# Patient Record
Sex: MALE | Race: WHITE | NOT HISPANIC OR LATINO | Employment: FULL TIME | URBAN - METROPOLITAN AREA
[De-identification: names, ages, dates, MRNs, and addresses within clinical notes are randomized per-mention and may not be internally consistent; named-entity substitution may affect disease eponyms.]

---
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
## 2022-03-25 ENCOUNTER — TELEPHONE (OUTPATIENT)
Dept: PSYCHIATRY | Facility: CLINIC | Age: 22
End: 2022-03-25

## 2022-04-22 ENCOUNTER — APPOINTMENT (EMERGENCY)
Dept: RADIOLOGY | Facility: HOSPITAL | Age: 22
End: 2022-04-22
Payer: MEDICARE

## 2022-04-22 ENCOUNTER — HOSPITAL ENCOUNTER (EMERGENCY)
Facility: HOSPITAL | Age: 22
Discharge: HOME | End: 2022-04-22
Attending: EMERGENCY MEDICINE
Payer: MEDICARE

## 2022-04-22 VITALS
RESPIRATION RATE: 18 BRPM | BODY MASS INDEX: 34.57 KG/M2 | TEMPERATURE: 99.7 F | HEIGHT: 74 IN | WEIGHT: 269.4 LBS | HEART RATE: 85 BPM | DIASTOLIC BLOOD PRESSURE: 78 MMHG | OXYGEN SATURATION: 97 % | SYSTOLIC BLOOD PRESSURE: 162 MMHG

## 2022-04-22 DIAGNOSIS — S61.219A FINGER LACERATION: Primary | ICD-10-CM

## 2022-04-22 PROCEDURE — 99284 EMERGENCY DEPT VISIT MOD MDM: CPT | Performed by: PHYSICIAN ASSISTANT

## 2022-04-22 PROCEDURE — 73140 X-RAY EXAM OF FINGER(S): CPT

## 2022-04-22 PROCEDURE — 90471 IMMUNIZATION ADMIN: CPT

## 2022-04-22 PROCEDURE — 12001 RPR S/N/AX/GEN/TRNK 2.5CM/<: CPT | Performed by: PHYSICIAN ASSISTANT

## 2022-04-22 PROCEDURE — 99283 EMERGENCY DEPT VISIT LOW MDM: CPT

## 2022-04-22 PROCEDURE — 90715 TDAP VACCINE 7 YRS/> IM: CPT | Performed by: PHYSICIAN ASSISTANT

## 2022-04-22 RX ORDER — LIDOCAINE HYDROCHLORIDE 10 MG/ML
10 INJECTION, SOLUTION EPIDURAL; INFILTRATION; INTRACAUDAL; PERINEURAL ONCE
Status: COMPLETED | OUTPATIENT
Start: 2022-04-22 | End: 2022-04-22

## 2022-04-22 RX ORDER — GINSENG 100 MG
1 CAPSULE ORAL ONCE
Status: COMPLETED | OUTPATIENT
Start: 2022-04-22 | End: 2022-04-22

## 2022-04-22 RX ADMIN — BACITRACIN 1 SMALL APPLICATION: 500 OINTMENT TOPICAL at 14:10

## 2022-04-22 RX ADMIN — TETANUS TOXOID, REDUCED DIPHTHERIA TOXOID AND ACELLULAR PERTUSSIS VACCINE, ADSORBED 0.5 ML: 5; 2.5; 8; 8; 2.5 SUSPENSION INTRAMUSCULAR at 14:15

## 2022-04-22 RX ADMIN — LIDOCAINE HYDROCHLORIDE 10 ML: 10 INJECTION, SOLUTION EPIDURAL; INFILTRATION; INTRACAUDAL at 14:10

## 2022-04-22 NOTE — ED PROVIDER NOTES
History  Chief Complaint   Patient presents with    Finger Laceration     Left hand, 2nd digit laceration from a knife [place] the [place]  Bleeding controlled  The patient is a 32-year-old male presents to the emergency department for evaluation of laceration to his left index finger  The patient states he was trying to cut a stick with a knife when the knife slipped cutting his finger  Patient's mother is unsure of when the patient last had a tetanus shot  He denies any numbness, tingling or loss of range of motion to his finger  He denies any further injuries at this time  History provided by:  Patient   used: No    Finger Laceration  Associated symptoms: no fever and no rash        Prior to Admission Medications   Prescriptions Last Dose Informant Patient Reported? Taking?   ibuprofen (MOTRIN) 400 mg tablet   No No   Sig: Take 1 tablet (400 mg total) by mouth every 6 (six) hours as needed for mild pain for up to 15 doses      Facility-Administered Medications: None       Past Medical History:   Diagnosis Date    ADHD     Autism        History reviewed  No pertinent surgical history  History reviewed  No pertinent family history  I have reviewed and agree with the history as documented  E-Cigarette/Vaping     E-Cigarette/Vaping Substances     Social History     Tobacco Use    Smoking status: Never Smoker    Smokeless tobacco: Never Used   Substance Use Topics    Alcohol use: No    Drug use: No       Review of Systems   Constitutional: Negative for chills and fever  HENT: Negative for ear pain and sore throat  Eyes: Negative for redness and visual disturbance  Respiratory: Negative for cough, shortness of breath and wheezing  Cardiovascular: Negative for chest pain  Gastrointestinal: Negative for abdominal pain, diarrhea, nausea and vomiting  Genitourinary: Negative for dysuria and hematuria     Musculoskeletal: Negative for back pain, neck pain and neck stiffness  Skin: Positive for wound (Laceration)  Negative for color change and rash  Neurological: Negative for dizziness, light-headedness and headaches  All other systems reviewed and are negative  Physical Exam  Physical Exam  Constitutional:       Appearance: Normal appearance  HENT:      Head: Normocephalic and atraumatic  Nose: Nose normal    Eyes:      Extraocular Movements: Extraocular movements intact  Conjunctiva/sclera: Conjunctivae normal    Pulmonary:      Effort: Pulmonary effort is normal  No respiratory distress  Musculoskeletal:         General: Normal range of motion  Cervical back: Normal range of motion and neck supple  Skin:     General: Skin is dry  Neurological:      General: No focal deficit present  Mental Status: He is alert and oriented to person, place, and time           Vital Signs  ED Triage Vitals   Temperature Pulse Respirations Blood Pressure SpO2   04/22/22 1334 04/22/22 1334 04/22/22 1334 04/22/22 1334 04/22/22 1334   99 7 °F (37 6 °C) 85 18 162/78 97 %      Temp Source Heart Rate Source Patient Position - Orthostatic VS BP Location FiO2 (%)   04/22/22 1334 04/22/22 1334 -- 04/22/22 1334 --   Oral Monitor  Left arm       Pain Score       04/22/22 1333       10 - Worst Possible Pain           Vitals:    04/22/22 1334   BP: 162/78   Pulse: 85         Visual Acuity      ED Medications  Medications   tetanus-diphtheria-acellular pertussis (BOOSTRIX) IM injection 0 5 mL (0 5 mL Intramuscular Given 4/22/22 1415)   lidocaine (PF) (XYLOCAINE-MPF) 1 % injection 10 mL (10 mL Infiltration Given 4/22/22 1410)   bacitracin topical ointment 1 small application (1 small application Topical Given 4/22/22 1410)       Diagnostic Studies  Results Reviewed     None                 XR finger second digit-index LEFT   ED Interpretation by Gilmar Waters PA-C (04/22 1408)   No acute osseous abnormality      Final Result by Jefry Goldsmith Cehpe Looney MD (04/22 1519)      No acute osseous abnormality  Findings are stable         Workstation performed: [id number]                    Procedures  Laceration repair    Date/Time: 4/22/2022 3:31 PM  Performed by: Damon Jj PA-C  Authorized by: Damon Jj PA-C   Consent: Verbal consent obtained  Risks and benefits: risks, benefits and alternatives were discussed  Consent given by: patient  Patient understanding: patient states understanding of the procedure being performed  Patient identity confirmed: verbally with patient  Body area: upper extremity  Laceration length: 2 cm  Foreign bodies: no foreign bodies  Tendon involvement: none  Nerve involvement: none  Vascular damage: no  Anesthesia: digital block and local infiltration    Anesthesia:  Local Anesthetic: lidocaine 1% without epinephrine  Anesthetic total: 5 mL    Sedation:  Patient sedated: no      Wound Dehiscence:  Superficial Wound Dehiscence: simple closure      Procedure Details:  Preparation: Patient was prepped and draped in the usual sterile fashion  Irrigation solution: saline  Irrigation method: syringe  Amount of cleaning: standard  Debridement: none  Degree of undermining: none  Skin closure: 5-0 nylon  Number of sutures: 5  Technique: simple  Approximation: close  Approximation difficulty: simple  Dressing: 4x4 sterile gauze and antibiotic ointment  Patient tolerance: patient tolerated the procedure well with no immediate complications               ED Course                                             MDM  Number of Diagnoses or Management Options  Finger laceration: new and requires workup  Diagnosis management comments: Patient presents for evaluation of laceration to left 2nd digit  X-ray obtained to rule out any bony involvement  Negative for bony involvement  Laceration was thoroughly cleansed and repaired with 5 simple interrupted sutures  Please see procedure note for more detail      Patient was educated on laceration care as well as signs of infection to look out for  He was advised to return to the emergency department immediately with any signs of infection, otherwise return in 7-10 days for suture removal     Patient is stable for discharge  Amount and/or Complexity of Data Reviewed  Tests in the radiology section of CPT®: ordered and reviewed  Decide to obtain previous medical records or to obtain history from someone other than the patient: yes  Review and summarize past medical records: yes    Risk of Complications, Morbidity, and/or Mortality  Presenting problems: low  Diagnostic procedures: low  Management options: low    Patient Progress  Patient progress: stable      Disposition  Final diagnoses:   Finger laceration     Time reflects when diagnosis was documented in both MDM as applicable and the Disposition within this note     Time User Action Codes Description Comment    4/22/2022  3:05 PM Roz Garcia Add [O95 028M] Finger laceration       ED Disposition     ED Disposition Condition Date/Time Comment    Discharge Stable Fri Apr 22, 2022  3:05 PM Flex Betancourt discharge to home/self care  Follow-up Information     Follow up With Specialties Details Why Contact Info Additional Information    [facility]  For suture removal 7-10 days [address] Emergency Department,  [id number], [facility]          Discharge Medication List as of 4/22/2022  3:05 PM      CONTINUE these medications which have NOT CHANGED    Details   ibuprofen (MOTRIN) 400 mg tablet Take 1 tablet (400 mg total) by mouth every 6 (six) hours as needed for mild pain for up to 15 doses, Starting Sun 9/23/2018, Normal             No discharge procedures on file      PDMP Review     None          ED Provider  Electronically Signed by           Erum Oliver PA-C  04/22/22 9438

## 2023-09-18 ENCOUNTER — TELEPHONE (OUTPATIENT)
Dept: PSYCHIATRY | Facility: CLINIC | Age: 23
End: 2023-09-18

## 2023-09-18 NOTE — LETTER
Dear Mariposa Pires : We are contacting you because your name is currently included on the [address] [place] [address] wait-list for Talk Therapy and/or Medication Management. (Please [place] which services are needed)     In our efforts to provide the highest quality care, [facility] has begun the process of upgrading our behavioral health systems to increase efficiency and expedite delivery of services. As part of this process, we ask you to please confirm your continued interest in the services above. If you are no longer interested or in need, please estuardo “No” in the area below. If you are still interested and in need, please estuardo “Yes” and provide your most current demographic and insurance information within 15 days. If we do not receive confirmation from you by October 3, 2023 your information will not be included in the system upgrade and your place on the waitlist will be lost.     Thank you in advance for your patience and understanding and we apologize for any inconvenience this may cause. Patient Name and :    Still in need of services: Yes or No     Current Address:     Phone#:     Best time to receive a call: Insurance Carrier:      Policy/ID#: Group#: Insurance Services Phone#:      What is your current presenting problem? Open to virtual talk therapy: Yes or No      We will call you to do an Intake when an appointment becomes available. You can send this information back to us in any of the ways below:    Email: [email or web address] Issa  Fax#:  [phone]  Mail:   [address] [place] [address], [address] [place] [address]